# Patient Record
Sex: FEMALE | URBAN - METROPOLITAN AREA
[De-identification: names, ages, dates, MRNs, and addresses within clinical notes are randomized per-mention and may not be internally consistent; named-entity substitution may affect disease eponyms.]

---

## 2018-07-09 ENCOUNTER — APPOINTMENT (OUTPATIENT)
Age: 53
Setting detail: DERMATOLOGY
End: 2018-07-09

## 2018-07-09 DIAGNOSIS — F42.4 EXCORIATION (SKIN-PICKING) DISORDER: ICD-10-CM

## 2018-07-09 DIAGNOSIS — L30.9 DERMATITIS, UNSPECIFIED: ICD-10-CM

## 2018-07-09 PROBLEM — S80.921A UNSPECIFIED SUPERFICIAL INJURY OF RIGHT LOWER LEG, INITIAL ENCOUNTER: Status: ACTIVE | Noted: 2018-07-09

## 2018-07-09 PROBLEM — S70.921A UNSPECIFIED SUPERFICIAL INJURY OF RIGHT THIGH, INITIAL ENCOUNTER: Status: ACTIVE | Noted: 2018-07-09

## 2018-07-09 PROCEDURE — OTHER OTHER: OTHER

## 2018-07-09 PROCEDURE — OTHER PRESCRIPTION: OTHER

## 2018-07-09 PROCEDURE — OTHER MIPS QUALITY: OTHER

## 2018-07-09 PROCEDURE — 99203 OFFICE O/P NEW LOW 30 MIN: CPT

## 2018-07-09 PROCEDURE — OTHER COUNSELING: OTHER

## 2018-07-09 PROCEDURE — OTHER TREATMENT REGIMEN: OTHER

## 2018-07-09 RX ORDER — PREDNISONE 10 MG/1
TABLET ORAL
Qty: 87 | Refills: 0 | Status: ERX | COMMUNITY
Start: 2018-07-09

## 2018-07-09 RX ORDER — HYDROXYZINE HYDROCHLORIDE 10 MG/1
TABLET, FILM COATED ORAL QHS
Qty: 90 | Refills: 0 | Status: ERX | COMMUNITY
Start: 2018-07-09

## 2018-07-09 ASSESSMENT — LOCATION SIMPLE DESCRIPTION DERM
LOCATION SIMPLE: LEFT THIGH
LOCATION SIMPLE: RIGHT LOWER BACK
LOCATION SIMPLE: ABDOMEN
LOCATION SIMPLE: RIGHT PRETIBIAL REGION
LOCATION SIMPLE: RIGHT THIGH

## 2018-07-09 ASSESSMENT — LOCATION DETAILED DESCRIPTION DERM
LOCATION DETAILED: RIGHT ANTERIOR LATERAL PROXIMAL THIGH
LOCATION DETAILED: PERIUMBILICAL SKIN
LOCATION DETAILED: RIGHT ANTERIOR PROXIMAL THIGH
LOCATION DETAILED: RIGHT INFERIOR MEDIAL MIDBACK
LOCATION DETAILED: RIGHT MEDIAL DISTAL PRETIBIAL REGION
LOCATION DETAILED: RIGHT PROXIMAL PRETIBIAL REGION
LOCATION DETAILED: RIGHT DISTAL PRETIBIAL REGION
LOCATION DETAILED: LEFT ANTERIOR PROXIMAL THIGH

## 2018-07-09 ASSESSMENT — LOCATION ZONE DERM
LOCATION ZONE: TRUNK
LOCATION ZONE: LEG

## 2018-07-09 NOTE — HPI: RASH
How Severe Is Your Rash?: severe
Is This A New Presentation, Or A Follow-Up?: Rash
Additional History: Pt has been using TAC 0.1% ointment bid X 2 months

## 2018-07-09 NOTE — PROCEDURE: TREATMENT REGIMEN
Detail Level: Detailed
Samples Given: Cerave anti-itch lotion, Vanicream moisturizers, cleansers, soap
Plan: F/U in 2 weeks for further evaluation. If condition has not improved or worsens, bx is recommended
Initiate Treatment: Morning:\\n- Claritin \\n- Zantac 150mg \\n- Prednisone 10mg - 30-day taper starting at 60 mg.  See scanned instructions\\n\\nEvening:\\n- Zantac 150mg\\n- Zyrtec \\n- Hydroxyzine 10mg: Take 1-3 pill QHS
Discontinue Regimen: TAC 0.1% ointment (previously prescribed by previous dermatologist)

## 2018-07-23 ENCOUNTER — APPOINTMENT (OUTPATIENT)
Age: 53
Setting detail: DERMATOLOGY
End: 2018-07-23

## 2018-07-23 DIAGNOSIS — L30.9 DERMATITIS, UNSPECIFIED: ICD-10-CM

## 2018-07-23 PROCEDURE — 99213 OFFICE O/P EST LOW 20 MIN: CPT

## 2018-07-23 PROCEDURE — OTHER COUNSELING: OTHER

## 2018-07-23 PROCEDURE — OTHER TREATMENT REGIMEN: OTHER

## 2018-07-23 PROCEDURE — OTHER OTHER: OTHER

## 2018-07-23 PROCEDURE — OTHER PRESCRIPTION: OTHER

## 2018-07-23 RX ORDER — BETAMETHASONE DIPROPIONATE 0.5 MG/G
CREAM TOPICAL
Qty: 1 | Refills: 1 | Status: ERX | COMMUNITY
Start: 2018-07-23

## 2018-07-23 ASSESSMENT — LOCATION DETAILED DESCRIPTION DERM
LOCATION DETAILED: LEFT ANTERIOR PROXIMAL THIGH
LOCATION DETAILED: RIGHT INFERIOR MEDIAL MIDBACK
LOCATION DETAILED: RIGHT PROXIMAL PRETIBIAL REGION
LOCATION DETAILED: RIGHT ANTERIOR PROXIMAL THIGH
LOCATION DETAILED: PERIUMBILICAL SKIN

## 2018-07-23 ASSESSMENT — LOCATION SIMPLE DESCRIPTION DERM
LOCATION SIMPLE: RIGHT LOWER BACK
LOCATION SIMPLE: RIGHT PRETIBIAL REGION
LOCATION SIMPLE: ABDOMEN
LOCATION SIMPLE: LEFT THIGH
LOCATION SIMPLE: RIGHT THIGH

## 2018-07-23 ASSESSMENT — LOCATION ZONE DERM
LOCATION ZONE: TRUNK
LOCATION ZONE: LEG

## 2018-07-23 NOTE — PROCEDURE: TREATMENT REGIMEN
Detail Level: Detailed
Modify Regimen: Betamethasone Cream BID/ up 2 weeks
Plan: F/U 1 month when off prednisone
Continue Regimen: Morning:\\n- Claritin \\n- Zantac 150mg \\n- Prednisone 10mg - 30-day taper starting at 60 mg.  See scanned instructions\\n\\nEvening:\\n- Zantac 150mg\\n- Zyrtec \\n- Hydroxyzine 10mg: Take 1-3 pill QHS

## 2018-08-23 ENCOUNTER — APPOINTMENT (OUTPATIENT)
Age: 53
Setting detail: DERMATOLOGY
End: 2018-08-26

## 2018-08-23 DIAGNOSIS — L30.9 DERMATITIS, UNSPECIFIED: ICD-10-CM

## 2018-08-23 PROCEDURE — OTHER PRESCRIPTION: OTHER

## 2018-08-23 PROCEDURE — OTHER COUNSELING: OTHER

## 2018-08-23 PROCEDURE — 11100: CPT

## 2018-08-23 PROCEDURE — OTHER BIOPSY BY PUNCH METHOD: OTHER

## 2018-08-23 PROCEDURE — OTHER TREATMENT REGIMEN: OTHER

## 2018-08-23 RX ORDER — TRIAMCINOLONE ACETONIDE 1 MG/G
CREAM TOPICAL
Qty: 1 | Refills: 0 | Status: ERX | COMMUNITY
Start: 2018-08-23

## 2018-08-23 ASSESSMENT — LOCATION DETAILED DESCRIPTION DERM
LOCATION DETAILED: RIGHT PROXIMAL PRETIBIAL REGION
LOCATION DETAILED: RIGHT ANTERIOR PROXIMAL THIGH
LOCATION DETAILED: RIGHT INFERIOR MEDIAL MIDBACK
LOCATION DETAILED: RIGHT POSTERIOR LATERAL PROXIMAL THIGH
LOCATION DETAILED: PERIUMBILICAL SKIN
LOCATION DETAILED: LEFT ANTERIOR PROXIMAL THIGH

## 2018-08-23 ASSESSMENT — LOCATION SIMPLE DESCRIPTION DERM
LOCATION SIMPLE: RIGHT THIGH
LOCATION SIMPLE: ABDOMEN
LOCATION SIMPLE: RIGHT LOWER BACK
LOCATION SIMPLE: LEFT THIGH
LOCATION SIMPLE: RIGHT PRETIBIAL REGION

## 2018-08-23 ASSESSMENT — LOCATION ZONE DERM
LOCATION ZONE: LEG
LOCATION ZONE: TRUNK

## 2018-08-23 NOTE — PROCEDURE: BIOPSY BY PUNCH METHOD
Hemostasis: None
Consent: Written consent was obtained and risks were reviewed including but not limited to scarring, infection, bleeding, scabbing, incomplete removal, nerve damage and allergy to anesthesia.
X Size Of Lesion In Cm (Optional): 0
Patient Will Remove Sutures At Home?: No
Was A Bandage Applied: Yes
Dressing: pressure dressing
Billing Type: Third-Party Bill
Epidermal Sutures: 4-0 Nylon
Anesthesia Type: 2% lidocaine with epinephrine
Anesthesia Volume In Cc (Will Not Render If 0): 1
Home Suture Removal Text: Patient was provided a home suture removal kit and will remove their sutures at home.  If they have any questions or difficulties they will call the office.
Punch Size In Mm: 3
Suture Removal: 14 days
Wound Care: Bacitracin
Biopsy Type: H and E
Notification Instructions: Patient will be notified of biopsy results. However, patient instructed to call the office if not contacted within 2 weeks.
Detail Level: Detailed
Post-Care Instructions: I reviewed with the patient in detail post-care instructions. Patient is to keep the biopsy site dry overnight, and then apply bacitracin twice daily until healed. Patient may apply hydrogen peroxide soaks to remove any crusting.

## 2018-09-06 ENCOUNTER — APPOINTMENT (OUTPATIENT)
Age: 53
Setting detail: DERMATOLOGY
End: 2018-09-07

## 2018-09-06 DIAGNOSIS — T81.31X: ICD-10-CM

## 2018-09-06 DIAGNOSIS — Z48.02 ENCOUNTER FOR REMOVAL OF SUTURES: ICD-10-CM

## 2018-09-06 DIAGNOSIS — L30.8 OTHER SPECIFIED DERMATITIS: ICD-10-CM

## 2018-09-06 PROBLEM — T81.31XA DISRUPTION OF EXTERNAL OPERATION (SURGICAL) WOUND, NOT ELSEWHERE CLASSIFIED, INITIAL ENCOUNTER: Status: ACTIVE | Noted: 2018-09-06

## 2018-09-06 PROCEDURE — 99214 OFFICE O/P EST MOD 30 MIN: CPT

## 2018-09-06 PROCEDURE — OTHER PRESCRIPTION: OTHER

## 2018-09-06 PROCEDURE — OTHER TREATMENT REGIMEN: OTHER

## 2018-09-06 PROCEDURE — OTHER SUTURE REMOVAL (GLOBAL PERIOD): OTHER

## 2018-09-06 PROCEDURE — OTHER COUNSELING: OTHER

## 2018-09-06 PROCEDURE — OTHER PATHOLOGY DISCUSSION: OTHER

## 2018-09-06 RX ORDER — MUPIROCIN 20 MG/G
OINTMENT TOPICAL
Qty: 1 | Refills: 0 | Status: ERX | COMMUNITY
Start: 2018-09-06

## 2018-09-06 ASSESSMENT — LOCATION DETAILED DESCRIPTION DERM
LOCATION DETAILED: RIGHT POSTERIOR LATERAL PROXIMAL THIGH
LOCATION DETAILED: RIGHT ANTERIOR PROXIMAL THIGH
LOCATION DETAILED: PERIUMBILICAL SKIN
LOCATION DETAILED: LEFT ANTERIOR DISTAL THIGH

## 2018-09-06 ASSESSMENT — LOCATION SIMPLE DESCRIPTION DERM
LOCATION SIMPLE: RIGHT THIGH
LOCATION SIMPLE: ABDOMEN
LOCATION SIMPLE: LEFT THIGH

## 2018-09-06 ASSESSMENT — SEVERITY ASSESSMENT: SEVERITY: MILD TO MODERATE

## 2018-09-06 ASSESSMENT — LOCATION ZONE DERM
LOCATION ZONE: TRUNK
LOCATION ZONE: LEG

## 2018-09-06 NOTE — PROCEDURE: TREATMENT REGIMEN
Continue Regimen: Triamcinolone cream- apply to affected area twice daily as needed\\n\\n\\nHydroxyzine at bedtime and other antihistamines as directed
Plan: Referred to [facility] for consult.  Will fax visit notes and pathology results
Detail Level: Zone